# Patient Record
Sex: FEMALE | Race: WHITE | Employment: UNEMPLOYED | URBAN - METROPOLITAN AREA
[De-identification: names, ages, dates, MRNs, and addresses within clinical notes are randomized per-mention and may not be internally consistent; named-entity substitution may affect disease eponyms.]

---

## 2021-01-04 PROBLEM — R31.9 BLOOD IN URINE: Status: ACTIVE | Noted: 2021-01-04

## 2021-01-04 PROBLEM — Z34.90 PREGNANCY: Status: ACTIVE | Noted: 2021-01-04

## 2021-01-04 PROBLEM — O26.899 ABDOMINAL CRAMPING AFFECTING PREGNANCY: Status: ACTIVE | Noted: 2021-01-04

## 2023-01-02 ENCOUNTER — HOSPITAL ENCOUNTER (EMERGENCY)
Age: 28
Discharge: HOME | End: 2023-01-02
Attending: EMERGENCY MEDICINE
Payer: OTHER GOVERNMENT

## 2023-01-02 ENCOUNTER — APPOINTMENT (OUTPATIENT)
Dept: ULTRASOUND IMAGING | Age: 28
End: 2023-01-02
Attending: PHYSICIAN ASSISTANT
Payer: OTHER GOVERNMENT

## 2023-01-02 VITALS
BODY MASS INDEX: 22.67 KG/M2 | WEIGHT: 120 LBS | DIASTOLIC BLOOD PRESSURE: 57 MMHG | SYSTOLIC BLOOD PRESSURE: 119 MMHG | HEART RATE: 95 BPM | OXYGEN SATURATION: 100 % | TEMPERATURE: 97.1 F | RESPIRATION RATE: 14 BRPM

## 2023-01-02 DIAGNOSIS — O20.0 MISCARRIAGE, THREATENED, EARLY PREGNANCY: ICD-10-CM

## 2023-01-02 DIAGNOSIS — O46.90 VAGINAL BLEEDING DURING PREGNANCY: Primary | ICD-10-CM

## 2023-01-02 LAB
ALBUMIN SERPL-MCNC: 4.1 G/DL (ref 3.4–5)
ALBUMIN/GLOB SERPL: 1.2 {RATIO} (ref 0.8–1.7)
ALP SERPL-CCNC: 46 U/L (ref 45–117)
ALT SERPL-CCNC: 20 U/L (ref 13–56)
ANION GAP SERPL CALC-SCNC: 3 MMOL/L (ref 3–18)
APPEARANCE UR: CLEAR
AST SERPL-CCNC: 16 U/L (ref 10–38)
BACTERIA URNS QL MICRO: ABNORMAL /HPF
BASOPHILS # BLD: 0 K/UL (ref 0–0.1)
BASOPHILS NFR BLD: 0 % (ref 0–2)
BILIRUB SERPL-MCNC: 0.3 MG/DL (ref 0.2–1)
BILIRUB UR QL: NEGATIVE
BUN SERPL-MCNC: 8 MG/DL (ref 7–18)
BUN/CREAT SERPL: 11 (ref 12–20)
CALCIUM SERPL-MCNC: 8.8 MG/DL (ref 8.5–10.1)
CHLORIDE SERPL-SCNC: 108 MMOL/L (ref 100–111)
CO2 SERPL-SCNC: 28 MMOL/L (ref 21–32)
COLOR UR: ABNORMAL
CREAT SERPL-MCNC: 0.73 MG/DL (ref 0.6–1.3)
DIFFERENTIAL METHOD BLD: NORMAL
EOSINOPHIL # BLD: 0.1 K/UL (ref 0–0.4)
EOSINOPHIL NFR BLD: 1 % (ref 0–5)
EPITH CASTS URNS QL MICRO: ABNORMAL /LPF (ref 0–5)
ERYTHROCYTE [DISTWIDTH] IN BLOOD BY AUTOMATED COUNT: 12.4 % (ref 11.6–14.5)
GLOBULIN SER CALC-MCNC: 3.4 G/DL (ref 2–4)
GLUCOSE SERPL-MCNC: 98 MG/DL (ref 74–99)
GLUCOSE UR STRIP.AUTO-MCNC: NEGATIVE MG/DL
HCG SERPL-ACNC: 112 MIU/ML (ref 0–10)
HCT VFR BLD AUTO: 38.5 % (ref 35–45)
HGB BLD-MCNC: 12.8 G/DL (ref 12–16)
HGB UR QL STRIP: ABNORMAL
IMM GRANULOCYTES # BLD AUTO: 0 K/UL (ref 0–0.04)
IMM GRANULOCYTES NFR BLD AUTO: 0 % (ref 0–0.5)
KETONES UR QL STRIP.AUTO: NEGATIVE MG/DL
LEUKOCYTE ESTERASE UR QL STRIP.AUTO: ABNORMAL
LYMPHOCYTES # BLD: 1.2 K/UL (ref 0.9–3.6)
LYMPHOCYTES NFR BLD: 21 % (ref 21–52)
MCH RBC QN AUTO: 30 PG (ref 24–34)
MCHC RBC AUTO-ENTMCNC: 33.2 G/DL (ref 31–37)
MCV RBC AUTO: 90.2 FL (ref 78–100)
MONOCYTES # BLD: 0.3 K/UL (ref 0.05–1.2)
MONOCYTES NFR BLD: 6 % (ref 3–10)
NEUTS SEG # BLD: 4.2 K/UL (ref 1.8–8)
NEUTS SEG NFR BLD: 73 % (ref 40–73)
NITRITE UR QL STRIP.AUTO: NEGATIVE
NRBC # BLD: 0 K/UL (ref 0–0.01)
NRBC BLD-RTO: 0 PER 100 WBC
PH UR STRIP: 7 [PH] (ref 5–8)
PLATELET # BLD AUTO: 278 K/UL (ref 135–420)
PMV BLD AUTO: 10.6 FL (ref 9.2–11.8)
POTASSIUM SERPL-SCNC: 3.9 MMOL/L (ref 3.5–5.5)
PROT SERPL-MCNC: 7.5 G/DL (ref 6.4–8.2)
PROT UR STRIP-MCNC: 30 MG/DL
RBC # BLD AUTO: 4.27 M/UL (ref 4.2–5.3)
RBC #/AREA URNS HPF: ABNORMAL /HPF (ref 0–5)
SODIUM SERPL-SCNC: 139 MMOL/L (ref 136–145)
SP GR UR REFRACTOMETRY: <1.005 (ref 1–1.03)
UROBILINOGEN UR QL STRIP.AUTO: 0.2 EU/DL (ref 0.2–1)
WBC # BLD AUTO: 5.9 K/UL (ref 4.6–13.2)
WBC URNS QL MICRO: ABNORMAL /HPF (ref 0–5)

## 2023-01-02 PROCEDURE — 99284 EMERGENCY DEPT VISIT MOD MDM: CPT

## 2023-01-02 PROCEDURE — 84702 CHORIONIC GONADOTROPIN TEST: CPT

## 2023-01-02 PROCEDURE — 81001 URINALYSIS AUTO W/SCOPE: CPT

## 2023-01-02 PROCEDURE — 80053 COMPREHEN METABOLIC PANEL: CPT

## 2023-01-02 PROCEDURE — 87086 URINE CULTURE/COLONY COUNT: CPT

## 2023-01-02 PROCEDURE — 76817 TRANSVAGINAL US OBSTETRIC: CPT

## 2023-01-02 PROCEDURE — 85025 COMPLETE CBC W/AUTO DIFF WBC: CPT

## 2023-01-02 RX ORDER — NITROFURANTOIN 25; 75 MG/1; MG/1
100 CAPSULE ORAL 2 TIMES DAILY
Qty: 6 CAPSULE | Refills: 0 | Status: SHIPPED | OUTPATIENT
Start: 2023-01-02 | End: 2023-01-05

## 2023-01-02 NOTE — ED TRIAGE NOTES
patient presents with vaginal bleeding x 1 day.   LMP- early November  Took positive home pregnancy  A+

## 2023-01-02 NOTE — Clinical Note
[facility] [place]  [facility]  [address]  [facility] [address]  [phone]    Work/School Note    Date: 1/2/2023    To Whom It May concern:    Nisha Richardson was seen and treated today in the emergency room by the following provider(s):  Attending Provider: Radha Lester MD  Physician Assistant: Chandra Mcintosh PA-C. Nisha Richardson is excused from work/school on 01/02/23 and 01/03/23. She is medically clear to return to work/school on 1/4/2023.        Sincerely,          Lucía Tello PA-C

## 2023-01-02 NOTE — ED PROVIDER NOTES
EMERGENCY DEPARTMENT HISTORY & PHYSICAL EXAM    [facility] EMERGENCY DEPT  2023, 11:52 AM    Clinical Impression:  1. Vaginal bleeding during pregnancy    2. Miscarriage, threatened, early pregnancy        Assessment/Differential Diagnosis:     Ddx early pregnancy, miscarriage, ectopic pregnancy, UTI, trauma, all considered    ED Course:   Initial assessment performed. The patients presenting problems have been discussed, and they are in agreement with the care plan formulated and outlined with them. I have encouraged them to ask questions as they arise throughout their visit. Pt here with vag bleeding started today, bled through one pad since this am, no large clots, no grey tissue, and minimal abd cramping. No fever, SOB, CP, trauma, urinary symtpoms, vag discharge before bleeding. LMP early 2022, + home preg . [id number]. Exam with pt appearing well. Ambulating without assistance or discomfort. Abd exam benign. Review of records, blood type A+  Will check labs, UA, US  Pt declines tylenol     Quant   US with no obvious pregnancy, or ectopic foci  UA with slt bacteria, started on macrobid, UC sent  Discussed results with pt. Aware need for follow up 2-3 days here or with ob/gyn. Return precautions discussed        Medical Chart Review:  I have reviewed triage nursing documentation. Review of old medical records with the following pertinent information:       Disposition:  Home  in good condition. Chief Complaint   Patient presents with    Pregnancy Problem     HPI:    The history is provided by patient. No  used. Araceli Caro is a 33 y.o. female presenting to the Emergency Department with complaints of vaginal bleeding with early pregnancy. Patient recalls her last menstrual cycle early November but is unsure of date. She did a home pregnancy test on  which was positive.   States at that time she has been trying to cut back on vaping nicotine, has stopped alcohol use and has stopped smoking marijuana daily. She has not made appointment with OB. She is G3, P1 Ab1 with early first trimester miscarriage with her first pregnancy. No history of ectopic pregnancy. Patient noticed this morning after urinating she had some blood with wiping of the tissue. Since that time the bleeding has increased slightly and she noticed a good amount of blood and a pad that she has worn since this morning. No obvious clots or gray tissue. Minimal abdominal cramping. She denies fever, chills, shortness of breath, chest pain, nausea, vomiting or diarrhea. She denies any abdominal trauma. No back pain, urinary symptoms and she denies any vaginal discharge prior to the bleeding. No concern for STD per patient. He denies any chronic medical problems and takes no chronic medications        I have reviewed all PMHX, FMHX and Social Hx as entered into the medical record in the chart below using the Epic Template. Review of Systems:  Constitutional:  Neg for fever, chills, weight changes. ENT:  Neg for Sore throat, runny nose, or other URI symptoms  Respiratory:  neg for cough, no shortness of breath, or wheezing  Cardiovascular:  No chest pain, chest pressure, palpitations. GI:  no vomiting, no diarrhea, + faint low abdominal pain/cramping. : no dysuria, no frequency, no urgency. no Flank pain. + vag bleeding/spotting. MSK: no myalgias  Integumentary: no rashes, lesions, or skin irritation. Neurological: no headaches  All other systems reviewed negative except was is positive in ROS and HPI. Past Medical History:  Past Medical History:   Diagnosis Date    Gestational diabetes        Past Surgical History:  Past Surgical History:   Procedure Laterality Date    HX OTHER SURGICAL  2018    wisdom teeth    HX WISDOM TEETH EXTRACTION         Family History:  No family history on file.     Social History:  Social History Tobacco Use    Smoking status: Former     Packs/day: 0.50     Types: Cigarettes     Quit date: 2020     Years since quittin.5    Smokeless tobacco: Never   Substance Use Topics    Alcohol use: Yes       Allergies:  No Known Allergies    Vital Signs:  Vitals:    23 1014   BP: (!) 119/57   Pulse: 95   Resp: 14   Temp: 97.1 °F (36.2 °C)   SpO2: 100%   Weight: 54.4 kg (120 lb)     Physical Exam:  Vital Signs Reviewed. Nursing Notes Reviewed. Constitutional:  Well developed, well nourished patient. Appearance and behavior are age and situation appropriate. Head: Normocephalic, Atraumatic  Eyes: Conjunctiva clear, lids normal. Sclera anicteric. PERRL.  ENT:  gross hearing normal, throat normal   Neck:  Supple, FROM. Trachea midline. No nuchal rigidity. Lungs: Lungs CTAB. No wheezes, rales or rhonchi. No respiratory distress, tachypnea or accessory muscle use. No cough. Cardiac:  RRR without murmur. No peripheral edema  Abdomen: soft, normal BS, nontender  Extremities:  no pedal edema  Neuro:  A&O, no obvious focal neuro deficit  Skin:  Warm, dry, no rash  Back:  No CVAT    Diagnostics:    Labs -     Recent Results (from the past 12 hour(s))   BETA HCG, QT    Collection Time: 23 10:30 AM   Result Value Ref Range    Beta HCG,  (H) 0 - 10 MIU/ML   METABOLIC PANEL, COMPREHENSIVE    Collection Time: 23 10:30 AM   Result Value Ref Range    Sodium 139 136 - 145 mmol/L    Potassium 3.9 3.5 - 5.5 mmol/L    Chloride 108 100 - 111 mmol/L    CO2 28 21 - 32 mmol/L    Anion gap 3 3.0 - 18 mmol/L    Glucose 98 74 - 99 mg/dL    BUN 8 7.0 - 18 MG/DL    Creatinine 0.73 0.6 - 1.3 MG/DL    BUN/Creatinine ratio 11 (L) 12 - 20      eGFR >60 >60 ml/min/1.73m2    Calcium 8.8 8.5 - 10.1 MG/DL    Bilirubin, total 0.3 0.2 - 1.0 MG/DL    ALT (SGPT) 20 13 - 56 U/L    AST (SGOT) 16 10 - 38 U/L    Alk.  phosphatase 46 45 - 117 U/L    Protein, total 7.5 6.4 - 8.2 g/dL    Albumin 4.1 3.4 - 5.0 g/dL    Globulin 3.4 2.0 - 4.0 g/dL    A-G Ratio 1.2 0.8 - 1.7     CBC WITH AUTOMATED DIFF    Collection Time: 01/02/23 10:30 AM   Result Value Ref Range    WBC 5.9 4.6 - 13.2 K/uL    RBC 4.27 4.20 - 5.30 M/uL    HGB 12.8 12.0 - 16.0 g/dL    HCT 38.5 35.0 - 45.0 %    MCV 90.2 78.0 - 100.0 FL    MCH 30.0 24.0 - 34.0 PG    MCHC 33.2 31.0 - 37.0 g/dL    RDW 12.4 11.6 - 14.5 %    PLATELET 104 464 - 719 K/uL    MPV 10.6 9.2 - 11.8 FL    NRBC 0.0 0  WBC    ABSOLUTE NRBC 0.00 0.00 - 0.01 K/uL    NEUTROPHILS 73 40 - 73 %    LYMPHOCYTES 21 21 - 52 %    MONOCYTES 6 3 - 10 %    EOSINOPHILS 1 0 - 5 %    BASOPHILS 0 0 - 2 %    IMMATURE GRANULOCYTES 0 0.0 - 0.5 %    ABS. NEUTROPHILS 4.2 1.8 - 8.0 K/UL    ABS. LYMPHOCYTES 1.2 0.9 - 3.6 K/UL    ABS. MONOCYTES 0.3 0.05 - 1.2 K/UL    ABS. EOSINOPHILS 0.1 0.0 - 0.4 K/UL    ABS. BASOPHILS 0.0 0.0 - 0.1 K/UL    ABS. IMM. GRANS. 0.0 0.00 - 0.04 K/UL    DF AUTOMATED     URINALYSIS W/ RFLX MICROSCOPIC    Collection Time: 01/02/23 12:30 PM   Result Value Ref Range    Color RED      Appearance CLEAR      Specific gravity <1.005 1.003 - 1.030    pH (UA) 7.0 5.0 - 8.0      Protein 30 (A) NEG mg/dL    Glucose Negative NEG mg/dL    Ketone Negative NEG mg/dL    Bilirubin Negative NEG      Blood LARGE (A) NEG      Urobilinogen 0.2 0.2 - 1.0 EU/dL    Nitrites Negative NEG      Leukocyte Esterase TRACE (A) NEG     URINE MICROSCOPIC ONLY    Collection Time: 01/02/23 12:30 PM   Result Value Ref Range    WBC 0 to 1 0 - 5 /hpf    RBC 21 to 30 0 - 5 /hpf    Epithelial cells 2+ 0 - 5 /lpf    Bacteria FEW (A) NEG /hpf       Radiologic Studies -   US OB TV W DOPPLER   Final Result      Pregnancy of unknown location with no intrauterine or ectopic pregnancy   identified. Recommend continued follow-up quantitative beta hCG levels and   consider repeat ultrasound in 7-10 days until definitive diagnosis.             CT Results  (Last 48 hours)      None          CXR Results  (Last 48 hours)      None            Medications given in the ED-  Medications - No data to display    Please note that this dictation was completed with [employer], the computer voice recognition software. Quite often unanticipated grammatical, syntax, homophones, and other interpretive errors are inadvertently transcribed by the computer software. Please disregard these errors. Please excuse any errors that have escaped final proofreading.

## 2023-01-02 NOTE — DISCHARGE INSTRUCTIONS
Your work-up today does show blood work that reveals that you are currently pregnant. Your ultrasound did not show any obvious pregnancy in your uterus or in your adnexa. This may be due to an early pregnancy, but could also be concerned for an early miscarriage or an ectopic pregnancy. Urine shows some bacteria so Macrobid was started. Take medication as prescribed. Pelvic rest, no sex, douche, tampons until reevaluated  It is very important that you follow-up with gynecology in the next 2 to 3 days for repeat blood work, reevaluation and possible ultrasound to determine if this is a healthy early pregnancy. Miscarriage or ectopic pregnancy which can be life-threatening.   Return to ER if new or worsening symptoms, heavy vaginal bleeding, abdominal pain or new concerns

## 2023-01-04 LAB
BACTERIA SPEC CULT: NORMAL
CC UR VC: NORMAL
SERVICE CMNT-IMP: NORMAL